# Patient Record
Sex: FEMALE | Race: WHITE
[De-identification: names, ages, dates, MRNs, and addresses within clinical notes are randomized per-mention and may not be internally consistent; named-entity substitution may affect disease eponyms.]

---

## 2020-07-21 ENCOUNTER — HOSPITAL ENCOUNTER (OUTPATIENT)
Dept: HOSPITAL 92 - L&D/OP | Age: 30
End: 2020-07-21
Attending: OBSTETRICS & GYNECOLOGY
Payer: COMMERCIAL

## 2020-07-21 VITALS — BODY MASS INDEX: 26.6 KG/M2

## 2020-07-21 DIAGNOSIS — N89.8: ICD-10-CM

## 2020-07-21 DIAGNOSIS — Z3A.40: ICD-10-CM

## 2020-07-21 DIAGNOSIS — O48.0: ICD-10-CM

## 2020-07-21 DIAGNOSIS — Z88.1: ICD-10-CM

## 2020-07-21 DIAGNOSIS — O99.89: Primary | ICD-10-CM

## 2020-07-21 LAB — AMNISURE INTERNAL CONTROL QC: (no result)

## 2020-07-21 PROCEDURE — 84112 EVAL AMNIOTIC FLUID PROTEIN: CPT

## 2020-07-22 ENCOUNTER — HOSPITAL ENCOUNTER (OUTPATIENT)
Dept: HOSPITAL 92 - L&D/OP | Age: 30
Discharge: HOME | End: 2020-07-22
Attending: OBSTETRICS & GYNECOLOGY
Payer: COMMERCIAL

## 2020-07-22 ENCOUNTER — HOSPITAL ENCOUNTER (INPATIENT)
Dept: HOSPITAL 92 - L&D/OP | Age: 30
LOS: 2 days | Discharge: HOME | End: 2020-07-24
Attending: OBSTETRICS & GYNECOLOGY | Admitting: OBSTETRICS & GYNECOLOGY
Payer: COMMERCIAL

## 2020-07-22 VITALS — BODY MASS INDEX: 26.6 KG/M2

## 2020-07-22 VITALS — SYSTOLIC BLOOD PRESSURE: 129 MMHG | TEMPERATURE: 99 F | DIASTOLIC BLOOD PRESSURE: 83 MMHG

## 2020-07-22 DIAGNOSIS — O47.1: Primary | ICD-10-CM

## 2020-07-22 DIAGNOSIS — O48.0: ICD-10-CM

## 2020-07-22 DIAGNOSIS — O48.0: Primary | ICD-10-CM

## 2020-07-22 DIAGNOSIS — Z3A.40: ICD-10-CM

## 2020-07-22 DIAGNOSIS — Z88.1: ICD-10-CM

## 2020-07-22 LAB
HGB BLD-MCNC: 13.2 G/DL (ref 12–16)
MCH RBC QN AUTO: 27.9 PG (ref 27–31)
MCV RBC AUTO: 82.1 FL (ref 78–98)
PLATELET # BLD AUTO: 242 THOU/UL (ref 130–400)
RBC # BLD AUTO: 4.72 MILL/UL (ref 4.2–5.4)
WBC # BLD AUTO: 13 THOU/UL (ref 4.8–10.8)

## 2020-07-22 PROCEDURE — 90384 RH IG FULL-DOSE IM: CPT

## 2020-07-22 PROCEDURE — 87340 HEPATITIS B SURFACE AG IA: CPT

## 2020-07-22 PROCEDURE — 85027 COMPLETE CBC AUTOMATED: CPT

## 2020-07-22 PROCEDURE — 86850 RBC ANTIBODY SCREEN: CPT

## 2020-07-22 PROCEDURE — 36415 COLL VENOUS BLD VENIPUNCTURE: CPT

## 2020-07-22 PROCEDURE — 84112 EVAL AMNIOTIC FLUID PROTEIN: CPT

## 2020-07-22 PROCEDURE — 86870 RBC ANTIBODY IDENTIFICATION: CPT

## 2020-07-22 PROCEDURE — 96372 THER/PROPH/DIAG INJ SC/IM: CPT

## 2020-07-22 PROCEDURE — 86900 BLOOD TYPING SEROLOGIC ABO: CPT

## 2020-07-22 PROCEDURE — 86901 BLOOD TYPING SEROLOGIC RH(D): CPT

## 2020-07-22 PROCEDURE — 86780 TREPONEMA PALLIDUM: CPT

## 2020-07-22 PROCEDURE — 87389 HIV-1 AG W/HIV-1&-2 AB AG IA: CPT

## 2020-07-22 PROCEDURE — 85461 HEMOGLOBIN FETAL: CPT

## 2020-07-22 NOTE — HP
TIME OF EVALUATION:  1740 hours until 1755 hours.



LOCATION:  Labor and Delivery Triage B.



CHIEF COMPLAINT:  Possible contractions at 40 weeks and 3 days.



HISTORY OF PRESENT ILLNESS:  In brief, this is a 30-year-old G1, P0, with an EDC of

July 19th making her 40 weeks and 3 days EGA.  She arrives with possible

contractions about every 3 to 5 minutes, but denies vaginal bleeding or leakage of

fluid.  She sees Dr. Guzman for care.  She denies any vaginal trauma, abdominal

trauma, shortness of breath, or fevers. 



REVIEW OF SYSTEMS:  Complete review of systems was checked and is otherwise negative

unless specified in the HPI. 



PAST OB HISTORY:  She is a G1, P0.



PAST SURGICAL HISTORY:  Left ankle fracture repair.



MEDICATIONS:  None.



ALLERGIES:  LEVAQUIN AND CECLOR.



SOCIAL HISTORY:  Negative for alcohol, tobacco, or drug use.



FAMILY HISTORY:  Noncontributory.



PHYSICAL EXAMINATION:

VITAL SIGNS:  Blood pressure is 120s/70s, pulse is in the 80s to 90s, and

respirations are 18 and unlabored. 

GENERAL:  She is in no acute distress, sitting up in bed. 

ABDOMEN:  Size appropriate. 

PELVIS:  Cervical exam per the RN; prior to my arrival is 1 cm dilation, about 25%

effacement, still -2 station.  No evidence of ruptured membranes or vaginal

bleeding. 



On fetal monitor, fetal heart tones were evaluated by me, and they are 130s to 140s

baseline, and they are reactive/category 1.  Contractions are seen on the

tocodynamometer with contractions every 3 to 5 minutes. 



ASSESSMENT:  This is a G1, P0, at late term, with latent phase of labor.  There is

no evidence of true labor or maternal fetal compromise. 



PLAN:  I reviewed with her the latent phase of labor.  I also discussed with her

possible induction now due to her EGA, but the patient declines.  She states that

she is scheduled for induction of labor on July 28th.  She is aware of the late term

pregnancy and possible implications, but will like to wait for spontaneous labor.

All questions were answered.  I offered her pain medication, but she declined, so

she will keep her followup within 24 to 48 hours. 







Job ID:  [id number]

## 2020-07-22 NOTE — PDOC.BPN
- Brief Progress Note





Interim Admission note





OBGYN On Call





This is a direct admission to Dr Guzman





DX 40+ weeks, latent labor (2nd presentation)





In Brief, I examined Michelle earlier today and she was 1cm, . WE offerred 

admission esrlier vs expectant management then and she opted for outpatient 

follow up. Iván now returns with recurrent CTX. No LOF, Good FM.





CX per Melanie (RN) is 2/-2.





Due to second presentation and EGA over 40 weeks, we will admit to see if 

labors. She would rather await labor than pit at this time, per RN.





We will notify Dr Guzman as well.





GBS neg

## 2020-07-22 NOTE — PRG
DATE OF SERVICE:  07/21/2020



PRIMARY OB:  Dr. Vinay Guzman.



CHIEF COMPLAINT:  Leakage of fluid.



HISTORY OF PRESENT ILLNESS:  Patient is a 30-year-old G1, P0 female, with an

intrauterine pregnancy at 40 weeks and 2 days with a history of some leakage into

her underwear that she noticed when she went to the bathroom about 2:30 p.m.  She

has not had any continued leaking since then.  She denies contractions or vaginal

bleeding.  She denies urinary urgency or frequency.  Reports that she has been

having migraine headaches that have been a chronic problem for her.  She denies

cough, fever, chest pain, shortness of breath, nausea, vomiting, diarrhea,

constipation, hip problems, knee problems, or muscle weakness.  Denies any new

rashes. 



PAST MEDICAL HISTORY:  Negative.



PAST SURGICAL HISTORY:  She has had dental and ankle surgeries.



SOCIAL HISTORY:  Denies drug, alcohol, or tobacco use.



ALLERGIES:  LEVAQUIN AND CECLOR.



MEDICATIONS:  Prenatal vitamins.



OB LABS:  Blood type is O negative.  Antibody screen is negative on her initial

visit.  Hepatitis B surface antigen is nonreactive.  VDRL is nonreactive.  HIV is

nonreactive.  GC and chlamydia negative.  She is rubella immune.  Her diabetes

screen was 159.  Patient has documentation of receiving RhoGAM.  Her third trimester

VDRL is nonreactive.  She is GBS negative. 



REVIEW OF SYSTEMS:  Per HPI.



PHYSICAL EXAMINATION:

VITAL SIGNS:  Blood pressure 100/59, heart rate of 62, respiratory rate 16,

saturating 97% on room air, and temperature 97.7. 

GENERAL:  She appears to be in no acute distress.  She is alert and oriented,

cooperative, and pleasant to interact with. 

HEAD:  Normocephalic and atraumatic. 

LUNGS:  Clear to auscultation bilaterally. 

HEART:  Has a regular rate and rhythm. 

ABDOMEN:  Gravid, soft, and nontender. 

EXTREMITIES:  Nontender and nonedematous. 

PELVIS:  Cervical exam per nursing staff is 1, 40% effacement, and -2 station.

Fetal heart tracing shows the fetus with a baseline in the 120s with moderate

long-term variability, positive 15 x 15 accelerations, no decelerations.  Patient

has tocometer showing contractions on occasion, about every 10 minutes or so, not

strongly felt by the patient. 



LABORATORY STUDIES:  AmniSure test is negative.



ASSESSMENT AND PLAN:  Patient is a 30-year-old G1, P0 female with an intrauterine

pregnancy at 40 weeks and 2 days, here for complaints of leakage of fluid.  Based on

her history of some isolated spot on her panties and a negative AmniSure test, I am

confident that the patient is not ruptured at this time.  Patient is being

discharged home.  She has been given term labor precautions.  She has an appointment

in a couple of days with her primary OB, Dr. Guzman. 







Job ID:  [id number]

## 2020-07-22 NOTE — PDOC.BPN
- Brief Progress Note





I have seen the patient at bedside. Her michael support person is also with her. 

Awaiting LDR transfer from triage

## 2020-07-23 LAB
HBSAG INDEX: 0.11 S/CO (ref 0–0.99)
HIV 1/2 INDEX: 0.21 S/CO (ref ?–1)
SYPHILIS ANTIBODY INDEX: 0.03 S/CO

## 2020-07-23 PROCEDURE — 0UQMXZZ REPAIR VULVA, EXTERNAL APPROACH: ICD-10-PCS | Performed by: OBSTETRICS & GYNECOLOGY

## 2020-07-23 PROCEDURE — 10H07YZ INSERTION OF OTHER DEVICE INTO PRODUCTS OF CONCEPTION, VIA NATURAL OR ARTIFICIAL OPENING: ICD-10-PCS | Performed by: OBSTETRICS & GYNECOLOGY

## 2020-07-23 NOTE — PDOC.LDPN
Labor & Delivery Progress Note





- Subjective


Subjective: comfortable





- Objective


Vital signs reviewed and normal: yes


General: resting


Dilation: 7


Effacement: 90%


Station: 0


FHT: early decelerations, variability present





- Assessment


(1) 40 weeks gestation of pregnancy


Code(s): Z3A.40 - 40 WEEKS GESTATION OF PREGNANCY   Current Visit: Yes   Status

: Acute   


Plan: pitocin for augmentation


-: 





Slow change now 7cm, IUPC placed, FHT reassuring.

## 2020-07-23 NOTE — PDOC.OPDEL
OB Operative/Delivery Note


Delivery Dr/Surgeon: Thomas


Pre-Delivery Diagnosis: active labor


Procedure/Post Delivery Dx: spontaneous vaginal delivery


Weeks gestation: 40


Anesthesia: epidural





- Findings


  ** A


Sex: female


Apgar - 1 min: 8


Apgar - 5 min: 9





- Additional Findings/Plan


Placenta delivered: spontaneous


Repaired Obstetrical Laceration: left labial


Estimated blood loss: 350ml


Post delivery plan: routine recovery

## 2020-07-24 VITALS — TEMPERATURE: 98.4 F | SYSTOLIC BLOOD PRESSURE: 128 MMHG | DIASTOLIC BLOOD PRESSURE: 86 MMHG

## 2020-07-24 RX ADMIN — DOCUSATE CALCIUM SCH MG: 240 CAPSULE, LIQUID FILLED ORAL at 20:46

## 2020-07-24 RX ADMIN — DOCUSATE CALCIUM SCH MG: 240 CAPSULE, LIQUID FILLED ORAL at 09:32

## 2020-07-24 NOTE — PDOC.PP
Post Partum Progress Note


Post Partum Day #: 1


Subjective: 





doing well, bonding, breast feeding, minimal lochia, minimal pain


PO intake tolerated: yes


Flatus: yes


Ambulation: yes


 Vital Signs (12 hours)











  Temp Pulse Resp BP Pulse Ox


 


 07/24/20 04:05  98.3 F  86  16  130/88  97


 


 07/24/20 00:30  98.2 F  76  20  111/58 L  98


 


 07/23/20 23:30  98.2 F  80  20  127/78  98














- Physical Examination


General: NAD


Respiratory: non-labored breathing


Abdominal: no distention


Neurological: no gross focal deficits


Psychiatric: A&Ox3, normal affect


Result Diagrams: 


 07/22/20 23:34





Additional Labs: 


 Post Partum Labs











Blood Type  O NEGATIVE   07/23/20  00:10    


 


Hep Bs Antigen  Non-Reactive S/CO (NonReactive)   07/22/20  23:34    











(1) 40 weeks gestation of pregnancy


Code(s): Z3A.40 - 40 WEEKS GESTATION OF PREGNANCY   Status: Acute   





(2) Vaginal delivery


Code(s): O80 - ENCOUNTER FOR FULL-TERM UNCOMPLICATED DELIVERY   Status: Acute   





- Assessment/Plan





PPD1 doing well, pt request DC home @ 24 hours, will review with nursery.

## 2021-11-10 ENCOUNTER — HOSPITAL ENCOUNTER (INPATIENT)
Dept: HOSPITAL 92 - CSHLD | Age: 31
LOS: 2 days | Discharge: HOME | End: 2021-11-12
Attending: OBSTETRICS & GYNECOLOGY | Admitting: OBSTETRICS & GYNECOLOGY
Payer: COMMERCIAL

## 2021-11-10 VITALS — BODY MASS INDEX: 27.1 KG/M2

## 2021-11-10 DIAGNOSIS — Z3A.40: ICD-10-CM

## 2021-11-10 DIAGNOSIS — F41.9: ICD-10-CM

## 2021-11-10 LAB
HBSAG INDEX: 0.2 S/CO (ref 0–0.99)
HGB BLD-MCNC: 13.4 G/DL (ref 12–15.5)
MCH RBC QN AUTO: 29.8 PG (ref 27–33)
MCV RBC AUTO: 87.5 FL (ref 81.6–98.3)
PLATELET # BLD AUTO: 232 10X3/UL (ref 150–450)
RBC # BLD AUTO: 4.49 10X6/UL (ref 3.9–5.03)
SYPHILIS ANTIBODY INDEX: 0.04 S/CO
WBC # BLD AUTO: 10.8 10X3/UL (ref 3.5–10.5)

## 2021-11-10 PROCEDURE — 3E033VJ INTRODUCTION OF OTHER HORMONE INTO PERIPHERAL VEIN, PERCUTANEOUS APPROACH: ICD-10-PCS | Performed by: OBSTETRICS & GYNECOLOGY

## 2021-11-10 PROCEDURE — 86780 TREPONEMA PALLIDUM: CPT

## 2021-11-10 PROCEDURE — 36415 COLL VENOUS BLD VENIPUNCTURE: CPT

## 2021-11-10 PROCEDURE — 85461 HEMOGLOBIN FETAL: CPT

## 2021-11-10 PROCEDURE — 86870 RBC ANTIBODY IDENTIFICATION: CPT

## 2021-11-10 PROCEDURE — 86901 BLOOD TYPING SEROLOGIC RH(D): CPT

## 2021-11-10 PROCEDURE — 87340 HEPATITIS B SURFACE AG IA: CPT

## 2021-11-10 PROCEDURE — 85027 COMPLETE CBC AUTOMATED: CPT

## 2021-11-10 PROCEDURE — 3E0334Z INTRODUCTION OF SERUM, TOXOID AND VACCINE INTO PERIPHERAL VEIN, PERCUTANEOUS APPROACH: ICD-10-PCS | Performed by: OBSTETRICS & GYNECOLOGY

## 2021-11-10 PROCEDURE — 86900 BLOOD TYPING SEROLOGIC ABO: CPT

## 2021-11-10 PROCEDURE — 90384 RH IG FULL-DOSE IM: CPT

## 2021-11-10 PROCEDURE — 99285 EMERGENCY DEPT VISIT HI MDM: CPT

## 2021-11-10 PROCEDURE — 86850 RBC ANTIBODY SCREEN: CPT

## 2021-11-10 PROCEDURE — 86922 COMPATIBILITY TEST ANTIGLOB: CPT

## 2021-11-10 PROCEDURE — 96372 THER/PROPH/DIAG INJ SC/IM: CPT

## 2021-11-10 PROCEDURE — 51702 INSERT TEMP BLADDER CATH: CPT

## 2021-11-11 LAB
HGB BLD-MCNC: 12.2 G/DL (ref 12–15.5)
MCH RBC QN AUTO: 30.2 PG (ref 27–33)
MCV RBC AUTO: 88.1 FL (ref 81.6–98.3)
PLATELET # BLD AUTO: 199 10X3/UL (ref 150–450)
RBC # BLD AUTO: 4.04 10X6/UL (ref 3.9–5.03)
WBC # BLD AUTO: 11 10X3/UL (ref 3.5–10.5)

## 2021-11-11 RX ADMIN — DOCUSATE CALCIUM SCH MG: 240 CAPSULE, LIQUID FILLED ORAL at 21:06

## 2021-11-11 RX ADMIN — DOCUSATE CALCIUM SCH MG: 240 CAPSULE, LIQUID FILLED ORAL at 09:26

## 2021-11-12 VITALS — DIASTOLIC BLOOD PRESSURE: 72 MMHG | TEMPERATURE: 97.8 F | SYSTOLIC BLOOD PRESSURE: 119 MMHG

## 2021-11-12 RX ADMIN — DOCUSATE CALCIUM SCH MG: 240 CAPSULE, LIQUID FILLED ORAL at 09:14
